# Patient Record
Sex: MALE | Race: WHITE | NOT HISPANIC OR LATINO | Employment: FULL TIME | URBAN - METROPOLITAN AREA
[De-identification: names, ages, dates, MRNs, and addresses within clinical notes are randomized per-mention and may not be internally consistent; named-entity substitution may affect disease eponyms.]

---

## 2018-06-15 ENCOUNTER — HOSPITAL ENCOUNTER (OUTPATIENT)
Dept: PHYSICAL THERAPY | Facility: HOSPITAL | Age: 38
Setting detail: THERAPIES SERIES
Discharge: HOME | End: 2018-06-15

## 2018-06-15 DIAGNOSIS — S33.6XXA SPRAIN OF SACROILIAC JOINT, INITIAL ENCOUNTER: Primary | ICD-10-CM

## 2018-06-15 PROCEDURE — 97110 THERAPEUTIC EXERCISES: CPT | Performed by: PHYSICAL THERAPIST

## 2018-06-15 PROCEDURE — 97161 PT EVAL LOW COMPLEX 20 MIN: CPT | Performed by: PHYSICAL THERAPIST

## 2018-06-15 NOTE — THERAPY EVALUATION
Outpatient Physical Therapy Ortho Initial Evaluation   [place]     Patient Name: Toy Hubbard  : 1980  MRN: [id number]  Today's Date: 6/15/2018      Visit Date: 06/15/2018    There is no problem list on file for this patient.       No past medical history on file.     No past surgical history on file.    Visit Dx:     ICD-10-CM ICD-9-CM   1. Sprain of sacroiliac joint, initial encounter S33.6XXA 846.9             Patient History     Row Name 06/15/18 0615             History    Chief Complaint Difficulty with daily activities;Pain  -GC      Type of Pain Back pain   right SI  -GC      Brief Description of Current Complaint Pt reports he has had SI joint problems since . He has been able to control the pain through exercise until recently. He states that over the last 8 months he has had increased pain since increasing his mileage he runs. He reports right SI pain with right sciatica symptoms and does have some left sided soreness also. He has had x-rays and MRI done both of which were negative.  -GC      Patient/Caregiver Goals Relieve pain;Improve mobility;Other (comment)   Pt wants to run without pain  -      Patient's Rating of General Health Very good  -GC      Hand Dominance right-handed  -      Occupation/sports/leisure activities pt is an avid runner  -      What clinical tests have you had for this problem? X-ray;MRI  -      Results of Clinical Tests negative  -GC         Pain     Pain Location Back;Buttocks   right SI  -GC      Pain at Present 1  -GC      Pain at Best 0  -GC      Pain at Worst 5  -GC      Pain Frequency Intermittent  -      Pain Description Aching;Discomfort;Sore;Radiating;Tender  -      What Performance Factors Make the Current Problem(s) WORSE? Pt c/o pain with physical activity and at times with sitting and walking  -      What Performance Factors Make the Current Problem(s) BETTER? Pt feels better if he rests, lies down  -      Difficulties  with recreational activities? Pt is unable to run/train the way he would like due to pain.  -GC         Daily Activities    Primary Language English  -GC      How does patient learn best? Listening;Reading  -GC      Teaching needs identified Home Exercise Program;Management of Condition  -GC      Patient is concerned about/has problems with Flexibility;Performing job responsibilities/community activities (work, school,;Performing sports, recreation, and play activities;Walking  -GC      Does patient have problems with the following? None  -GC      Barriers to learning None  -GC      Pt Participated in POC and Goals Yes  -GC         Safety    Are you being hurt, hit, or frightened by anyone at home or in your life? No  -GC      Are you being neglected by a caregiver No  -GC        User Key  (r) = Recorded By, (t) = Taken By, (c) = Cosigned By    Initials Name Provider Type    GC Navin Martinez, PT Physical Therapist                PT Ortho     Row Name 06/15/18 0615       Posture/Observations    Scoliosis Normal  -GC    Lumbar lordosis Decreased  -GC    Iliac crests Bilateral:;Normal  -GC    Posture/Observations Comments Pt has positive right LLNTT for DF/EV and DF/INV biases at approximately 30 degrees of SLRing  -GC       Neural Tension Signs- Lower Quarter Clearing    Slump Right:;Positive  -GC       Sensory Screen for Light Touch- Lower Quarter Clearing    L2 (anterior mid thigh) Bilateral:;Intact  -GC    L3 (distal anterior thigh) Bilateral:;Intact  -GC    L4 (medial lower leg/foot) Bilateral:;Intact  -GC    L5 (lateral lower leg/great toe) Bilateral:;Intact  -GC       Lumbosacral Palpation    SI Right:;Tender   right on left posterior sacral torsion  -GC    Lumbosacral Segment Tender   FRS left at L5/S1  -GC    Piriformis Right:;Tender  -GC    Gluteus Jaime Right:;Tender  -GC       Lumbosacral Accessory Motions    PA [place]- L1 WNL  -GC    PA [place]- L2 WNL  -GC    PA [place]- L3 WNL  -GC    PA Glide- L4  Hypomobile  -GC    PA Glide- L5 Hypomobile  -GC    PA glide- Sacral base Hypomobile  -GC       Lumbar/SI Special Tests    Stork Test (SI Dysfunction) Right:;Positive  -GC    SLR (Neural Tension) Right:;Positive  -GC    LALO (hip vs. SI Dysfunction) Bilateral:;Negative  -GC       Head/Neck/Trunk    Trunk Extension AROM 75% range with pain  -GC    Trunk Flexion AROM 50% range-limited by hamstring tightness  -GC    Trunk Lt Lateral Flexion AROM WFL  -GC    Trunk Rt Lateral Flexion AROM WFL  -GC    Trunk Lt Rotation AROM WFL  -GC    Trunk Rt Rotation AROM WFL  -GC       Trunk (Manual Muscle Testing)    MMT, Gross Movement: Trunk Flexion (5/5) normal  -GC    MMT, Gross Movement: Trunk Extension (5/5) normal  -GC       Right Hip (Manual Muscle Testing)    MMT: Flexion, Right Hip flexion  -GC    MMT, Gross Movement: Right Hip Flexion (5/5) normal  -GC    MMT: Extension, Right Hip extension  -GC    MMT, Gross Movement: Right Hip Extension (5/5) normal  -GC    MMT: ABduction, Right Hip abduction  -GC    MMT, Gross Movement: Right Hip ABduction (4+/5) good plus  -GC    MMT, Gross Movement: Right Hip ADduction (4+/5) good plus   adduction  -GC    MMT, Gross Movement: Right Hip Internal (Medial) Rotation (4/5) good   ER  -GC    MMT, Gross Movement: Right Hip External (Lateral) Rotation (4+/5) good plus   IR  -GC       Lower Extremity Flexibility    Hamstrings Right:;Moderately limited;Left:;WNL  -GC    Hip Flexors Bilateral:;WNL  -GC    Quadriceps Bilateral:;WNL  -GC    ITB Bilateral:;WNL  -GC    Hip External Rotators Right:;Mildly limited;Left:;WNL  -GC    Hip Internal Rotators Right:;Mildly limited;Left:;WNL  -GC       Transfers    Comment (Transfers) Pt is independent with all bed mobility and transfers  -GC       Gait/Stairs Assessment/Training    Comment (Gait/Stairs) Pt ambulates normally on levels  -      User Key  (r) = Recorded By, (t) = Taken By, (c) = Cosigned By    Initials Name Provider Type     Navin Martinez  PT Physical Therapist                      Therapy Education  Given: HEP, Symptoms/condition management, Pain management, Posture/body mechanics  Program: New  How Provided: Verbal, Demonstration, Written  Provided to: Patient  Level of Understanding: Teach back education performed, Verbalized, Demonstrated           PT OP Goals     Row Name 06/15/18 0615          PT Short Term Goals    STG Date to Achieve 06/29/18  -GC     STG 1 Decrease SI joint pain to 2-3/10 with activity.  -GC     STG 2 Increase trunk AROM to WNL all planes with testing.  -GC     STG 3 Increase hamstring and piriformis flexibility to WFL wiht testing.  -GC     STG 4 Pt will be independent with his HEP issued by this therapist.  -GC        Long Term Goals    LTG Date to Achieve 07/13/18  -GC     LTG 1 Decrease SI joint pain to 0-1/10 with activity.  -GC     LTG 2 Clear right LE of neural tension with testing.  -GC     LTG 3 Pt will be independent with all ADLs and able to run without increased pain.  -        Time Calculation    PT Goal Re-Cert Due Date 07/13/18  -       User Key  (r) = Recorded By, (t) = Taken By, (c) = Cosigned By    Initials Name Provider Type    GC Navin Martinez, PT Physical Therapist                PT Assessment/Plan     Row Name 06/15/18 0615          PT Assessment    Functional Limitations Limitations in functional capacity and performance;Performance in leisure activities  -     Impairments Impaired flexibility;Range of motion;Pain;Muscle strength;Joint mobility  -     Assessment Comments Pt presents with several month history of right SI paion and sciatica symptoms. He rates his pain up to 5/10 with activity. He has decreased trunk ROM, decreased spinal mobility, decreased hamstring and piriformis flexibility, decreased core and hip strength, and decreased fundtion secondary to the above.  -     Please refer to paper survey for additional self-reported information Yes  -GC     Rehab Potential Good  -GC      Patient/caregiver participated in establishment of treatment plan and goals Yes  -GC     Patient would benefit from skilled therapy intervention Yes  -GC        PT Plan    PT Frequency 1x/week;2x/week  -     Predicted Duration of Therapy Intervention (Therapy Eval) 4 weeks  -GC     Planned CPT's? PT EVAL LOW COMPLEXITY: 76125;PT THER PROC EA 15 MIN: 95388;PT MANUAL THERAPY EA 15 MIN: 22608;PT HOT OR COLD PACK TREAT MCARE;PT ELECTRICAL STIM UNATTEND:   -     PT Plan Comments Pt is to continue his HEP 2x daily  -       User Key  (r) = Recorded By, (t) = Taken By, (c) = Cosigned By    Initials Name Provider Type     Navin Juan, PT Physical Therapist                  Exercises     Row Name 06/15/18 0758 06/15/18 0615          Total Minutes    41793 - PT Therapeutic Exercise Minutes 28  -GC  --        Exercise 1    Exercise Name 1  -- Hamstring stretch  -GC     Cueing 1  -- Verbal;Tactile  -GC     Reps 1  -- 15  -GC     Time 1  -- 10 secs  -GC        Exercise 2    Exercise Name 2  -- Piriformis stretch  -GC     Cueing 2  -- Verbal;Tactile  -GC     Reps 2  -- 15  -GC     Time 2  -- 10 secs  -GC        Exercise 3    Exercise Name 3  -- LLNT mobilizations for DF/EV and DF/INV biases  -GC     Cueing 3  -- Verbal;Tactile  -GC     Time 3  -- 5 min  -GC        Exercise 4    Exercise Name 4  -- MET for FRS left at L5/S1 and right on left posterior sacral torsion  -GC     Cueing 4  -- Verbal;Tactile  -GC     Time 4  -- 3 min  -GC        Exercise 5    Exercise Name 5  -- Unilateral press up on right   -GC     Cueing 5  -- Verbal;Demo  -GC     Reps 5  -- 20  -GC        Exercise 6    Exercise Name 6  -- Supine clam shell vs theraband  -GC     Cueing 6  -- Verbal;Tactile  -GC     Reps 6  -- 25 bilateral  -GC     Additional Comments  -- silver  -GC        Exercise 7    Exercise Name 7  -- Bridge vs theraband  -GC     Cueing 7  -- Verbal;Tactile  -GC     Reps 7  -- 25  -GC     Additional Comments  -- silver  -GC         Exercise 8    Exercise Name 8  -- Hip ER vs theraband  -GC     Cueing 8  -- Verbal;Tactile  -GC        Exercise 9    Exercise Name 9  -- Hip IR vs theraband  -GC       User Key  (r) = Recorded By, (t) = Taken By, (c) = Cosigned By    Initials Name Provider Type    GC Navin Martinez, PT Physical Therapist                                  Time Calculation:     Therapy Suggested Charges     Code   Minutes Charges    77259 (CPT®) Hc Pt Neuromusc Re Education Ea 15 Min      05072 (CPT®) Hc Pt Ther Proc Ea 15 Min 28 2    21075 (CPT®) Hc Gait Training Ea 15 Min      31225 (CPT®) Hc Pt Therapeutic Act Ea 15 Min      83165 (CPT®) Hc Pt Manual Therapy Ea 15 Min      01125 (CPT®) Hc Pt Ther Massage- Per 15 Min      85959 (CPT®) Hc Pt Iontophoresis Ea 15 Min      89281 (CPT®) Hc Pt Elec Stim Ea-Per 15 Min      12918 (CPT®) Hc Pt Ultrasound Ea 15 Min      99326 (CPT®) Hc Pt Self Care/Mgmt/Train Ea 15 Min      Total  28 2          Start Time: 0615  Stop Time: 0712  Time Calculation (min): 57 min     Therapy Charges for Today     Code Description Service Date Service Provider Modifiers Qty    1980856 HC PT THER PROC EA 15 MIN 6/15/2018 Navin Martinez, PT GP 2    89486607339 HC PT EVAL LOW COMPLEXITY 2 6/15/2018 Navin Martinez, PT GP 1                    Navin Martinez, PT  6/15/2018

## 2018-06-18 ENCOUNTER — APPOINTMENT (OUTPATIENT)
Dept: PHYSICAL THERAPY | Facility: HOSPITAL | Age: 38
End: 2018-06-18

## 2018-06-20 ENCOUNTER — HOSPITAL ENCOUNTER (OUTPATIENT)
Dept: PHYSICAL THERAPY | Facility: HOSPITAL | Age: 38
Setting detail: THERAPIES SERIES
Discharge: HOME | End: 2018-06-20

## 2018-06-20 DIAGNOSIS — S33.6XXA SPRAIN OF SACROILIAC JOINT, INITIAL ENCOUNTER: Primary | ICD-10-CM

## 2018-06-20 PROCEDURE — 97110 THERAPEUTIC EXERCISES: CPT | Performed by: PHYSICAL THERAPIST

## 2018-06-20 NOTE — THERAPY TREATMENT NOTE
Outpatient Physical Therapy Ortho Treatment Note  [facility]     Patient Name: Toy Hubbard  : 1980  MRN: [id number]  Today's Date: 2018      Visit Date: 2018    Visit Dx:    ICD-10-CM ICD-9-CM   1. Sprain of sacroiliac joint, initial encounter S33.6XXA 846.9       There is no problem list on file for this patient.       No past medical history on file.     No past surgical history on file.          PT Ortho     Row Name 18 0610       Subjective Comments    Subjective Comments Pt states he feels better today than he has felt in the last 6 months.  -GC       Lumbosacral Accessory Motions    PA [place]- L4 WNL  -GC    PA Glide- L5 Hypomobile  -GC    PA glide- Sacral base Hypomobile  -GC       Lumbar/SI Special Tests    Stork Test (SI Dysfunction) Right:;Positive  -GC    SLR (Neural Tension) Right:;Positive  -GC       Head/Neck/Trunk    Trunk Extension AROM WFL  -GC    Trunk Flexion AROM 75% range  -GC    Trunk Lt Lateral Flexion AROM WFL  -GC    Trunk Rt Lateral Flexion AROM WFL  -GC    Trunk Lt Rotation AROM WFL  -GC    Trunk Rt Rotation AROM WFL  -GC      User Key  (r) = Recorded By, (t) = Taken By, (c) = Cosigned By    Initials Name Provider Type    KRAIG Martinez, PT Physical Therapist                            PT Assessment/Plan     Row Name 18 0610          PT Assessment    Assessment Comments Pt is doing well with decreased c/o pain, improved ROM and flexiblity, and improved alignment and neural mobility.  -GC        PT Plan    PT Plan Comments Pt is to continue his HEP daily. will re-ck again next week.  -GC       User Key  (r) = Recorded By, (t) = Taken By, (c) = Cosigned By    Initials Name Provider Type    KRAIG Martinez PT Physical Therapist                    Exercises     Row Name 18 0610             Subjective Comments    Subjective Comments Pt states he feels better today than he has felt in the last 6 months.  -GC         Total Minutes    89458 -  PT Therapeutic Exercise Minutes 31  -GC         Exercise 1    Exercise Name 1 Hamstring stretch  -GC      Cueing 1 Verbal;Tactile  -GC      Reps 1 15  -GC      Time 1 10 secs  -GC         Exercise 2    Exercise Name 2 Piriformis stretch  -GC      Cueing 2 Verbal;Tactile  -GC      Reps 2 15  -GC      Time 2 10 secs  -GC         Exercise 3    Exercise Name 3 LLNT mobilizations for DF/EV and DF/INV biases  -GC      Cueing 3 Verbal;Tactile  -GC      Time 3 5 min  -GC         Exercise 4    Exercise Name 4 MET for FRS left at L5/S1 and right on left posterior sacral torsion  -GC      Cueing 4 Verbal;Tactile  -GC      Time 4 3 min  -GC         Exercise 5    Exercise Name 5 Unilateral press up on right   -GC      Cueing 5 Verbal;Demo  -GC      Reps 5 20  -GC         Exercise 6    Exercise Name 6 Supine clam shell vs theraband  -GC      Cueing 6 Verbal;Tactile  -GC      Reps 6 25 bilateral  -GC      Additional Comments gold  -GC         Exercise 7    Exercise Name 7 Bridge vs theraband  -GC      Cueing 7 Verbal;Tactile  -GC      Reps 7 25  -GC      Additional Comments gold  -GC         Exercise 8    Exercise Name 8 Hip ER vs theraband  -GC      Cueing 8 Verbal;Tactile  -GC      Reps 8 25  -GC      Additional Comments gold  -GC         Exercise 9    Exercise Name 9 Hip IR vs theraband  -GC      Cueing 9 Verbal;Tactile  -GC      Reps 9 25  -GC      Additional Comments gold  -GC         Exercise 10    Exercise Name 10 AP mobilizations L4, L5  -GC      Time 10 3 min  -GC        User Key  (r) = Recorded By, (t) = Taken By, (c) = Cosigned By    Initials Name Provider Type     Navin Martinez PT Physical Therapist                                            Time Calculation:   Start Time: 0610  Stop Time: 0648  Time Calculation (min): 38 min  Therapy Suggested Charges     Code   Minutes Charges    12628 (CPT®) Hc Pt Neuromusc Re Education Ea 15 Min      10440 (CPT®) Hc Pt Ther Proc Ea 15 Min 31 2    79272 (CPT®) Hc Gait Training Ea  15 Min      64302 (CPT®) Hc Pt Therapeutic Act Ea 15 Min      92092 (CPT®) Hc Pt Manual Therapy Ea 15 Min      96239 (CPT®) Hc Pt Ther Massage- Per 15 Min      46302 (CPT®) Hc Pt Iontophoresis Ea 15 Min      37595 (CPT®) Hc Pt Elec Stim Ea-Per 15 Min      45623 (CPT®) Hc Pt Ultrasound Ea 15 Min      44799 (CPT®) Hc Pt Self Care/Mgmt/Train Ea 15 Min      Total  31 2        Therapy Charges for Today     Code Description Service Date Service Provider Modifiers Qty    86588492542 HC PT THER PROC EA 15 MIN 6/20/2018 Navin Martinez, PT GP 2                    Navin Martinez, PT  6/20/2018

## 2018-08-23 ENCOUNTER — DOCUMENTATION (OUTPATIENT)
Dept: PHYSICAL THERAPY | Facility: HOSPITAL | Age: 38
End: 2018-08-23

## 2018-08-23 DIAGNOSIS — S33.6XXA SPRAIN OF SACROILIAC JOINT, INITIAL ENCOUNTER: Primary | ICD-10-CM

## 2018-08-23 NOTE — THERAPY TREATMENT NOTE
Outpatient Physical Therapy Ortho Progress Note       Patient Name: Toy Hubbard  : 1980  MRN: [id number]  Today's Date: 2018      Visit Date: 2018    Visit Dx:    ICD-10-CM ICD-9-CM   1. Sprain of sacroiliac joint, initial encounter S33.6XXA 846.9       There is no problem list on file for this patient.       No past medical history on file.     No past surgical history on file.          PT Ortho     Row Name 18 3661       Subjective Comments    Subjective Comments Pt states he is feeling better, but he did have some right sided tightness after doin a 4 mile race.  -GC       Lumbosacral Palpation    SI Right:;Tender   right on left post. sacral torsion  -GC    Lumbosacral Segment Tender   FRS left at L5/S1  -GC       Lumbosacral Accessory Motions    PA Glide- L5 Hypomobile  -GC    PA glide- Sacral base Hypomobile  -GC       Lumbar/SI Special Tests    Stork Test (SI Dysfunction) Right:;Positive  -GC    LALO (hip vs. SI Dysfunction) Right:;Positive  -GC       Head/Neck/Trunk    Trunk Extension AROM WFL  -GC    Trunk Flexion AROM WFL  -GC    Trunk Lt Lateral Flexion AROM WFL  -GC    Trunk Rt Lateral Flexion AROM WFL  -GC    Trunk Lt Rotation AROM WFL  -GC    Trunk Rt Rotation AROM WFL  -GC      User Key  (r) = Recorded By, (t) = Taken By, (c) = Cosigned By    Initials Name Provider Type    Navin Nolan, PT Physical Therapist                            PT Assessment/Plan     Row Name 18 8514          PT Assessment    Assessment Comments Pt is doing well with improved ROM and better function.  -GC        PT Plan    PT Plan Comments Pt is to continue his HEP daily.  -GC       User Key  (r) = Recorded By, (t) = Taken By, (c) = Cosigned By    Initials Name Provider Type    Navin Nolan, PT Physical Therapist                    Exercises     Row Name 18 5357             Subjective Comments    Subjective Comments Pt states he is feeling better, but he did have some  right sided tightness after doin a 4 mile race.  -GC         Exercise 1    Exercise Name 1 Hamstring stretch  -GC      Cueing 1 Verbal;Tactile  -GC      Reps 1 15  -GC      Time 1 10 secs  -GC         Exercise 2    Exercise Name 2 Piriformis stretch  -GC      Cueing 2 Verbal;Tactile  -GC      Reps 2 15  -GC      Time 2 10 secs  -GC         Exercise 3    Exercise Name 3 LLNT mobilizations for DF/EV and DF/INV biases  -GC      Cueing 3 Verbal;Tactile  -GC      Time 3 5 min  -GC         Exercise 4    Exercise Name 4 MET for FRS left at L5/S1 and right on left posterior sacral torsion  -GC      Cueing 4 Verbal;Tactile  -GC      Time 4 3 min  -GC         Exercise 5    Exercise Name 5 Unilateral press up on right   -GC      Cueing 5 Verbal;Demo  -GC      Reps 5 20  -GC         Exercise 6    Exercise Name 6 Prone hip extension  -GC      Cueing 6 Verbal;Tactile  -GC      Reps 6 40  -GC         Exercise 7    Exercise Name 7 Prone chest raise  -GC      Cueing 7 Verbal;Tactile  -GC      Reps 7 25  -GC        User Key  (r) = Recorded By, (t) = Taken By, (c) = Cosigned By    Initials Name Provider Type     Navin Martinez, PT Physical Therapist                                            Time Calculation:      Therapy Suggested Charges     Code   Minutes Charges    None                         Navin Martinez, JULES  8/23/2018

## 2020-06-19 ENCOUNTER — OFFICE VISIT (OUTPATIENT)
Dept: FAMILY MEDICINE CLINIC | Facility: CLINIC | Age: 40
End: 2020-06-19

## 2020-06-19 VITALS
DIASTOLIC BLOOD PRESSURE: 80 MMHG | HEART RATE: 57 BPM | WEIGHT: 160 LBS | OXYGEN SATURATION: 98 % | HEIGHT: 73 IN | SYSTOLIC BLOOD PRESSURE: 128 MMHG | BODY MASS INDEX: 21.2 KG/M2 | TEMPERATURE: 98 F

## 2020-06-19 DIAGNOSIS — Z00.00 ANNUAL PHYSICAL EXAM: Primary | ICD-10-CM

## 2020-06-19 PROCEDURE — 99386 PREV VISIT NEW AGE 40-64: CPT | Performed by: FAMILY MEDICINE

## 2020-06-19 NOTE — PROGRESS NOTES
Chief Complaint   Patient presents with   • Annual Exam   • Establish Care       Subjective     Toy Hubbard is a 40 y.o. male and is here for a yearly physical exam. The patient reports no problems.    Do you take any herbs or supplements that were not prescribed by a doctor? yes. If so, these will be added to active medication list.    40-year-old male here for annual exam and to help establish care    Denies any known past medical history.    He is a cross-country runner.  He does do physical therapy for occasional strengthening/functional concerns.  He does not smoke.  His BMI is appropriate and he is normotensive.  He takes no daily medications and eats healthy.  No known medical issues.  He feels well overall.  He works in software.    He denies any family history of colon or breast cancer.  No known family history of heart disease or diabetes.    He is up-to-date on his vaccinations and tetanus as far as he is aware.  He gets yearly flu shots.    History reviewed. No pertinent past medical history.    Past Surgical History:   Procedure Laterality Date   • GANGLION CYST EXCISION  12/12/2015   • WISDOM TOOTH EXTRACTION  1998       Family History   Problem Relation Age of Onset   • Obesity Maternal Grandmother    • Obesity Maternal Grandfather        Social History     Socioeconomic History   • Marital status:      Spouse name: Not on file   • Number of children: Not on file   • Years of education: Not on file   • Highest education level: Not on file   Tobacco Use   • Smoking status: Never Smoker   • Smokeless tobacco: Never Used   Substance and Sexual Activity   • Alcohol use: Yes     Comment: 1-2 per week   • Drug use: Never       No current outpatient medications on file prior to visit.     No current facility-administered medications on file prior to visit.        Review of Systems   Constitutional: Negative for activity change, chills and fever.   HENT: Negative for congestion, postnasal  "drip and rhinorrhea.    Eyes: Negative for blurred vision and pain.   Respiratory: Negative for cough, chest tightness and shortness of breath.    Cardiovascular: Negative for chest pain.   Gastrointestinal: Negative for abdominal pain, constipation, diarrhea, nausea and vomiting.   Endocrine: Negative for cold intolerance and heat intolerance.   Genitourinary: Negative for decreased urine volume, dysuria and frequency.   Musculoskeletal: Negative for arthralgias, back pain and myalgias.   Skin: Negative for rash and skin lesions.   Neurological: Negative for dizziness and confusion.   Psychiatric/Behavioral: Negative for agitation, behavioral problems and depressed mood.         Vitals:    06/19/20 0932   BP: 128/80   Pulse: 57   Temp: 98 °F (36.7 °C)   SpO2: 98%   Weight: 72.6 kg (160 lb)   Height: 185.4 cm (73\")       General Appearance:  Alert, cooperative, no distress, appears stated age   Head:  Normocephalic, without obvious abnormality, atraumatic   Eyes:  PERRL, conjunctiva/corneas clear   Ears:  Normal TM's and external ear canals, both ears   Nose: Nares normal, septum midline, mucosa normal, no drainage or sinus tenderness   Throat: Lips, mucosa, and tongue normal; teeth and gums normal   Neck: Supple, symmetrical, trachea midline, no adenopathy;   Thyroid.   Back:  Symmetric, no curvature, ROM normal, no CVA tenderness   Lungs:  Clear to auscultation bilaterally, respirations unlabored   Chest wall:  No tenderness or deformity   Heart:  Regular rate and rhythm, S1 and S2 normal, no murmur, rub or gallop   Abdomen:  Soft, non-tender, bowel sounds active all four quadrants,   no masses, no organomegaly   Rectal:        Extremities: Extremities normal, atraumatic, no cyanosis or edema   Pulses: 2+ and symmetric all extremities   Skin: Skin color, texture, turgor normal, no rashes or lesions   Lymph nodes: Cervical, supraclavicular, and axillary nodes normal   Neurologic: Normal strength, sensation and " reflexes   throughout        No results found for this or any previous visit.  Assessment/Plan   Healthy male exam.    Toy was seen today for annual exam and establish care.    Diagnoses and all orders for this visit:    Annual physical exam  -     Lipid panel; Future  -     CBC & Differential; Future  -     Comprehensive metabolic panel; Future  -     TSH; Future      1. Annual    2. Patient Counseling:  --Nutrition: Stressed importance of moderation in sodium/caffeine intake, saturated fat and cholesterol.  Discussed caloric balance, sufficient intake of fresh fruits, vegetables, fiber, calcium, iron.  --Discussed the new recommendation against daily use of baby aspirin for primary prevention in low risk patients.  --Exercise: Stressed the importance of regular exercise.   --Substance Abuse: Discussed cessation/primary prevention of tobacco, alcohol, or other drug use; driving or other dangerous activities under the influence.    --Dental health: Discussed importance of regular tooth brushing, flossing, and dental visits.  -- suggested having eyes and vision checked if needed or past due.  --Immunizations reviewed.  --Discussed benefits of screening colonoscopy.    3. Discussed the patient's BMI with him.  The BMI is in the acceptable range  4. Follow up as needed for acute illness    -Very active and healthy.  -No current complaints or concerns.  -Will schedule for fasting labs    Lindsay Le MD  [facility]

## 2020-06-24 ENCOUNTER — RESULTS ENCOUNTER (OUTPATIENT)
Dept: FAMILY MEDICINE CLINIC | Facility: CLINIC | Age: 40
End: 2020-06-24

## 2020-06-24 DIAGNOSIS — Z00.00 ANNUAL PHYSICAL EXAM: ICD-10-CM

## 2020-07-14 LAB
ALBUMIN SERPL-MCNC: 4.5 G/DL (ref 3.5–5.2)
ALBUMIN/GLOB SERPL: 2 G/DL
ALP SERPL-CCNC: 64 U/L (ref 39–117)
ALT SERPL-CCNC: 36 U/L (ref 1–41)
AST SERPL-CCNC: 32 U/L (ref 1–40)
BASOPHILS # BLD AUTO: 0.04 10*3/MM3 (ref 0–0.2)
BASOPHILS NFR BLD AUTO: 0.8 % (ref 0–1.5)
BILIRUB SERPL-MCNC: 1.9 MG/DL (ref 0–1.2)
BUN SERPL-MCNC: 17 MG/DL (ref 6–20)
BUN/CREAT SERPL: 18.9 (ref 7–25)
CALCIUM SERPL-MCNC: 9.2 MG/DL (ref 8.6–10.5)
CHLORIDE SERPL-SCNC: 100 MMOL/L (ref 98–107)
CHOLEST SERPL-MCNC: 183 MG/DL (ref 0–200)
CO2 SERPL-SCNC: 28 MMOL/L (ref 22–29)
CREAT SERPL-MCNC: 0.9 MG/DL (ref 0.76–1.27)
EOSINOPHIL # BLD AUTO: 0.13 10*3/MM3 (ref 0–0.4)
EOSINOPHIL NFR BLD AUTO: 2.6 % (ref 0.3–6.2)
ERYTHROCYTE [DISTWIDTH] IN BLOOD BY AUTOMATED COUNT: 12.5 % (ref 12.3–15.4)
GLOBULIN SER CALC-MCNC: 2.3 GM/DL
GLUCOSE SERPL-MCNC: 91 MG/DL (ref 65–99)
HCT VFR BLD AUTO: 43.2 % (ref 37.5–51)
HDLC SERPL-MCNC: 65 MG/DL (ref 40–60)
HGB BLD-MCNC: 14.9 G/DL (ref 13–17.7)
IMM GRANULOCYTES # BLD AUTO: 0.01 10*3/MM3 (ref 0–0.05)
IMM GRANULOCYTES NFR BLD AUTO: 0.2 % (ref 0–0.5)
LDLC SERPL CALC-MCNC: 108 MG/DL (ref 0–100)
LYMPHOCYTES # BLD AUTO: 2.32 10*3/MM3 (ref 0.7–3.1)
LYMPHOCYTES NFR BLD AUTO: 46.1 % (ref 19.6–45.3)
Lab: NORMAL
MCH RBC QN AUTO: 30.8 PG (ref 26.6–33)
MCHC RBC AUTO-ENTMCNC: 34.5 G/DL (ref 31.5–35.7)
MCV RBC AUTO: 89.4 FL (ref 79–97)
MONOCYTES # BLD AUTO: 0.54 10*3/MM3 (ref 0.1–0.9)
MONOCYTES NFR BLD AUTO: 10.7 % (ref 5–12)
NEUTROPHILS # BLD AUTO: 1.99 10*3/MM3 (ref 1.7–7)
NEUTROPHILS NFR BLD AUTO: 39.6 % (ref 42.7–76)
NRBC BLD AUTO-RTO: 0 /100 WBC (ref 0–0.2)
PLATELET # BLD AUTO: 255 10*3/MM3 (ref 140–450)
POTASSIUM SERPL-SCNC: 4.8 MMOL/L (ref 3.5–5.2)
PROT SERPL-MCNC: 6.8 G/DL (ref 6–8.5)
RBC # BLD AUTO: 4.83 10*6/MM3 (ref 4.14–5.8)
SODIUM SERPL-SCNC: 137 MMOL/L (ref 136–145)
TRIGL SERPL-MCNC: 52 MG/DL (ref 0–150)
TSH SERPL DL<=0.005 MIU/L-ACNC: 1.62 UIU/ML (ref 0.27–4.2)
VLDLC SERPL CALC-MCNC: 10.4 MG/DL
WBC # BLD AUTO: 5.03 10*3/MM3 (ref 3.4–10.8)

## 2020-07-15 ENCOUNTER — TELEPHONE (OUTPATIENT)
Dept: FAMILY MEDICINE CLINIC | Facility: CLINIC | Age: 40
End: 2020-07-15

## 2021-04-06 ENCOUNTER — BULK ORDERING (OUTPATIENT)
Dept: CASE MANAGEMENT | Facility: OTHER | Age: 41
End: 2021-04-06

## 2021-04-06 DIAGNOSIS — Z23 IMMUNIZATION DUE: ICD-10-CM

## 2022-12-19 ENCOUNTER — OFFICE VISIT (OUTPATIENT)
Dept: INTERNAL MEDICINE | Facility: CLINIC | Age: 42
End: 2022-12-19

## 2022-12-19 VITALS
DIASTOLIC BLOOD PRESSURE: 68 MMHG | HEIGHT: 73 IN | WEIGHT: 159.8 LBS | SYSTOLIC BLOOD PRESSURE: 106 MMHG | BODY MASS INDEX: 21.18 KG/M2 | HEART RATE: 59 BPM | OXYGEN SATURATION: 99 % | TEMPERATURE: 97.8 F

## 2022-12-19 DIAGNOSIS — Z00.00 ENCOUNTER FOR WELLNESS EXAMINATION IN ADULT: Primary | ICD-10-CM

## 2022-12-19 DIAGNOSIS — Z23 NEED FOR INFLUENZA VACCINATION: ICD-10-CM

## 2022-12-19 PROBLEM — Z91.09 ENVIRONMENTAL ALLERGIES: Chronic | Status: ACTIVE | Noted: 2022-12-19

## 2022-12-19 PROCEDURE — 90471 IMMUNIZATION ADMIN: CPT | Performed by: NURSE PRACTITIONER

## 2022-12-19 PROCEDURE — 99396 PREV VISIT EST AGE 40-64: CPT | Performed by: NURSE PRACTITIONER

## 2022-12-19 PROCEDURE — 90686 IIV4 VACC NO PRSV 0.5 ML IM: CPT | Performed by: NURSE PRACTITIONER

## 2022-12-19 NOTE — PROGRESS NOTES
"Subjective    Toy Hubbard is a 42 y.o. male presenting today for   Chief Complaint   Patient presents with   • Establish Care     New patient   • Annual Exam       History of Present Illness     Toy Hubbard presents today as a new patient to me to establish care.   Prior PCP was Lindsay Le.  Patient Care Team:  Brenda Thurman APRN as PCP - General (Family Medicine)    Current/chronic health conditions include:    Patient Active Problem List   Diagnosis   • Environmental allergies       No outpatient medications have been marked as taking for the 12/19/22 encounter (Office Visit) with Brenda Thurman APRN.       In terms of exercise, he runs competitively, up to 80 miles/wk. There is also some wgt lifting each week.  In terms of nutrition, \"I know what is healthy and try to eat healthy but my diet is all over the place.\"    There is no FH of prostate or colon cancers.        The following portions of the patient's history were reviewed and updated as appropriate: allergies, current medications, problem list, past medical history, past surgical history, family history, and social history.         Objective    Vitals:    12/19/22 1244   BP: 106/68   BP Location: Left arm   Pulse: 59   Temp: 97.8 °F (36.6 °C)   SpO2: 99%   Weight: 72.5 kg (159 lb 12.8 oz)   Height: 184.4 cm (72.6\")     Body mass index is 21.32 kg/m².  Nursing notes and vitals reviewed.    Physical Exam  Constitutional:       General: He is not in acute distress.     Appearance: Normal appearance. He is well-developed.   HENT:      Head: Normocephalic.      Right Ear: Hearing, tympanic membrane, ear canal and external ear normal.      Left Ear: Hearing, tympanic membrane, ear canal and external ear normal.      Nose: Nose normal. No mucosal edema or rhinorrhea.      Mouth/Throat:      Mouth: Mucous membranes are moist.      Pharynx: Oropharynx is clear. Uvula midline.   Eyes:      General: Lids are normal.    "   Extraocular Movements: Extraocular movements intact.      Conjunctiva/sclera: Conjunctivae normal.      Pupils: Pupils are equal, round, and reactive to light.   Neck:      Thyroid: No thyroid mass or thyromegaly.   Cardiovascular:      Rate and Rhythm: Regular rhythm.      Pulses: Normal pulses.      Heart sounds: S1 normal and S2 normal. No murmur heard.    No friction rub. No gallop.   Pulmonary:      Effort: Pulmonary effort is normal.      Breath sounds: Normal breath sounds. No wheezing, rhonchi or rales.   Abdominal:      General: Bowel sounds are normal.      Palpations: Abdomen is soft.      Tenderness: There is no abdominal tenderness. There is no guarding.      Hernia: No hernia is present.   Musculoskeletal:         General: No deformity. Normal range of motion.      Cervical back: Normal range of motion and neck supple.   Lymphadenopathy:      Cervical: No cervical adenopathy.   Skin:     General: Skin is warm and dry.      Findings: No lesion or rash.   Neurological:      General: No focal deficit present.      Mental Status: He is alert and oriented to person, place, and time.      Cranial Nerves: No cranial nerve deficit.      Sensory: No sensory deficit.      Motor: Motor function is intact.      Coordination: Coordination is intact.      Gait: Gait normal.      Deep Tendon Reflexes: Reflexes are normal and symmetric.   Psychiatric:         Attention and Perception: He is attentive.         Mood and Affect: Mood and affect normal.         Speech: Speech normal.         Behavior: Behavior normal.         Thought Content: Thought content normal.         No results found for this or any previous visit (from the past 672 hour(s)).      Assessment and Plan    Diagnoses and all orders for this visit:    1. Encounter for wellness examination in adult (Primary)  -     CBC (No Diff)  -     Comprehensive Metabolic Panel  -     Hepatitis C Antibody  -     Lipid Panel With / Chol / HDL Ratio  -     TSH    2.  Need for influenza vaccination  -     FluLaval/Fluzone >6 mos (3422-8568)        Preventative counseling completed including relevant screenings, appropriate vaccinations, healthy nutrition, and appropriate physical activity. Age-appropriate Screening & Interventions recommended by USPSTF were reviewed with the patient, and Health Maintenance was updated in Epic.        Medications, including side effects, were discussed with the patient. Patient verbalized understanding.  The plan of care was discussed. All questions were answered. Patient verbalized understanding.        Return for fasting labs ASAP.

## 2022-12-22 LAB
ALBUMIN SERPL-MCNC: 4.4 G/DL (ref 3.5–5.2)
ALBUMIN/GLOB SERPL: 2 G/DL
ALP SERPL-CCNC: 99 U/L (ref 39–117)
ALT SERPL-CCNC: 36 U/L (ref 1–41)
AST SERPL-CCNC: 31 U/L (ref 1–40)
BILIRUB SERPL-MCNC: 2.1 MG/DL (ref 0–1.2)
BUN SERPL-MCNC: 17 MG/DL (ref 6–20)
BUN/CREAT SERPL: 17.5 (ref 7–25)
CALCIUM SERPL-MCNC: 9 MG/DL (ref 8.6–10.5)
CHLORIDE SERPL-SCNC: 102 MMOL/L (ref 98–107)
CHOLEST SERPL-MCNC: 184 MG/DL (ref 0–200)
CHOLEST/HDLC SERPL: 3.02 {RATIO}
CO2 SERPL-SCNC: 30.1 MMOL/L (ref 22–29)
CREAT SERPL-MCNC: 0.97 MG/DL (ref 0.76–1.27)
EGFRCR SERPLBLD CKD-EPI 2021: 100 ML/MIN/1.73
ERYTHROCYTE [DISTWIDTH] IN BLOOD BY AUTOMATED COUNT: 12.5 % (ref 12.3–15.4)
GLOBULIN SER CALC-MCNC: 2.2 GM/DL
GLUCOSE SERPL-MCNC: 97 MG/DL (ref 65–99)
HCT VFR BLD AUTO: 42.5 % (ref 37.5–51)
HCV AB S/CO SERPL IA: 0.1 S/CO RATIO (ref 0–0.9)
HDLC SERPL-MCNC: 61 MG/DL (ref 40–60)
HGB BLD-MCNC: 14.7 G/DL (ref 13–17.7)
LDLC SERPL CALC-MCNC: 113 MG/DL (ref 0–100)
MCH RBC QN AUTO: 31.3 PG (ref 26.6–33)
MCHC RBC AUTO-ENTMCNC: 34.6 G/DL (ref 31.5–35.7)
MCV RBC AUTO: 90.6 FL (ref 79–97)
PLATELET # BLD AUTO: 264 10*3/MM3 (ref 140–450)
POTASSIUM SERPL-SCNC: 4.6 MMOL/L (ref 3.5–5.2)
PROT SERPL-MCNC: 6.6 G/DL (ref 6–8.5)
RBC # BLD AUTO: 4.69 10*6/MM3 (ref 4.14–5.8)
SODIUM SERPL-SCNC: 139 MMOL/L (ref 136–145)
TRIGL SERPL-MCNC: 51 MG/DL (ref 0–150)
TSH SERPL DL<=0.005 MIU/L-ACNC: 2.14 UIU/ML (ref 0.27–4.2)
VLDLC SERPL CALC-MCNC: 10 MG/DL (ref 5–40)
WBC # BLD AUTO: 5.51 10*3/MM3 (ref 3.4–10.8)

## 2022-12-23 ENCOUNTER — OFFICE VISIT (OUTPATIENT)
Dept: INTERNAL MEDICINE | Facility: CLINIC | Age: 42
End: 2022-12-23

## 2022-12-23 ENCOUNTER — HOSPITAL ENCOUNTER (OUTPATIENT)
Dept: GENERAL RADIOLOGY | Facility: HOSPITAL | Age: 42
Discharge: HOME | End: 2022-12-23
Admitting: NURSE PRACTITIONER

## 2022-12-23 VITALS
OXYGEN SATURATION: 95 % | BODY MASS INDEX: 21.66 KG/M2 | TEMPERATURE: 97.8 F | SYSTOLIC BLOOD PRESSURE: 104 MMHG | HEART RATE: 50 BPM | HEIGHT: 73 IN | WEIGHT: 163.4 LBS | DIASTOLIC BLOOD PRESSURE: 68 MMHG

## 2022-12-23 DIAGNOSIS — S69.91XA HAND INJURY, RIGHT, INITIAL ENCOUNTER: ICD-10-CM

## 2022-12-23 DIAGNOSIS — M79.641 RIGHT HAND PAIN: Primary | ICD-10-CM

## 2022-12-23 PROCEDURE — 99213 OFFICE O/P EST LOW 20 MIN: CPT | Performed by: NURSE PRACTITIONER

## 2022-12-23 PROCEDURE — 73130 X-RAY EXAM OF HAND: CPT

## 2022-12-23 NOTE — PROGRESS NOTES
"Toy Hubbard is a 42 y.o. male presenting today for   Chief Complaint   Patient presents with   • Hand Pain     Injured right thumb 4 weeks ago while playing soccer and pain has not improved.       Subjective    History of Present Illness     Injury to R hand playing soccer approx 4 wks ago. Notes continued pain at the base of the 1st digit. The level of pain is unchanged. No OTCs or home remedies.      The following portions of the patient's history were reviewed and updated as appropriate: allergies, current medications, problem list, past medical history, past surgical history, family history, and social history.          Objective    Vitals:    12/23/22 1339   BP: 104/68   BP Location: Left arm   Pulse: 50   Temp: 97.8 °F (36.6 °C)   TempSrc: Infrared   SpO2: 95%   Weight: 74.1 kg (163 lb 6.4 oz)   Height: 184.4 cm (72.6\")     Body mass index is 21.8 kg/m².  Nursing notes and vitals reviewed.    Physical Exam  Constitutional:       General: He is not in acute distress.     Appearance: He is well-developed.   Pulmonary:      Effort: Pulmonary effort is normal. No respiratory distress.   Musculoskeletal:      Right hand: Bony tenderness present. No swelling or deformity. Normal range of motion. Decreased strength of finger abduction. Normal sensation. Normal capillary refill.   Neurological:      Mental Status: He is alert and oriented to person, place, and time.   Psychiatric:         Speech: Speech normal.         Thought Content: Thought content normal.           Assessment and Plan    Diagnoses and all orders for this visit:    1. Right hand pain (Primary)  -     XR Hand 3+ View Right    2. Hand injury, right, initial encounter  -     XR Hand 3+ View Right        Anticipate referral to Hand Surgery pending XR results.      Medications, including side effects, were discussed with the patient. Patient verbalized understanding.  The plan of care was discussed. All questions were answered. Patient " verbalized understanding.        Return if symptoms worsen or fail to improve.

## 2022-12-30 ENCOUNTER — PATIENT ROUNDING (BHMG ONLY) (OUTPATIENT)
Dept: INTERNAL MEDICINE | Facility: CLINIC | Age: 42
End: 2022-12-30

## 2022-12-30 NOTE — PROGRESS NOTES
My name is Steph Sharma and I am the Referral clerk at [facility].     I would like  to officially welcome you to our practice and ask about your recent visit.     Tell me about your visit with us. What things went well?        We're always looking for ways to make our patients' experiences even better. Do you have recommendations on ways we may improve?      Overall were you satisfied with your first visit to our practice?        I appreciate you taking the time to answer these questions. Is there anything else I can do for you?       Thank you, and have a great day.     Steph

## 2025-01-10 ENCOUNTER — TELEPHONE (OUTPATIENT)
Dept: INTERNAL MEDICINE | Facility: CLINIC | Age: 45
End: 2025-01-10
Payer: COMMERCIAL

## 2025-01-17 ENCOUNTER — OFFICE VISIT (OUTPATIENT)
Dept: INTERNAL MEDICINE | Facility: CLINIC | Age: 45
End: 2025-01-17
Payer: COMMERCIAL

## 2025-01-17 VITALS
DIASTOLIC BLOOD PRESSURE: 62 MMHG | TEMPERATURE: 98.7 F | HEIGHT: 73 IN | BODY MASS INDEX: 21.74 KG/M2 | SYSTOLIC BLOOD PRESSURE: 98 MMHG | OXYGEN SATURATION: 98 % | HEART RATE: 50 BPM | WEIGHT: 164 LBS

## 2025-01-17 DIAGNOSIS — M79.10 MUSCLE PAIN: ICD-10-CM

## 2025-01-17 DIAGNOSIS — R68.89 EXERCISE INTOLERANCE: ICD-10-CM

## 2025-01-17 DIAGNOSIS — M62.81 MUSCLE WEAKNESS: ICD-10-CM

## 2025-01-17 DIAGNOSIS — Z23 NEED FOR INFLUENZA VACCINATION: ICD-10-CM

## 2025-01-17 DIAGNOSIS — Z00.00 ENCOUNTER FOR WELLNESS EXAMINATION IN ADULT: Primary | ICD-10-CM

## 2025-01-17 PROCEDURE — 90656 IIV3 VACC NO PRSV 0.5 ML IM: CPT | Performed by: NURSE PRACTITIONER

## 2025-01-17 PROCEDURE — 90471 IMMUNIZATION ADMIN: CPT | Performed by: NURSE PRACTITIONER

## 2025-01-17 PROCEDURE — 99396 PREV VISIT EST AGE 40-64: CPT | Performed by: NURSE PRACTITIONER

## 2025-01-17 NOTE — PROGRESS NOTES
"NGHIA Yeager is a 44 y.o. male presenting for Annual Exam (Would like to have same day labs done today and include ferritin labs. Is fasting.  )    His current/chronic health conditions include:  Patient Active Problem List   Diagnosis    Environmental allergies       Outpatient Medications Marked as Taking for the 1/17/25 encounter (Office Visit) with Brenda Thurman APRN   Medication Sig Dispense Refill    Ferrous Sulfate (SLOW FE PO) Take  by mouth.            Health Habits:  Nutrition: \"decent\"  Exercise: regularly    Screenings:  PSA: n/a  Colon Cancer: n/a  Tob use: n/a      While he states he feels well overall, he has noted a slightly decreased exercise capacity while running. He also notes fluctuations in stool consistency; looser on some days and then solid every 3 days or so. He was concerned this could perhaps be related to low iron and requests to check this level. He does affirms some muscle pain and weakness.      The patient's allergies, current medications, problem list, past medical history, past family history, and past social history were reviewed and updated as appropriate.        OBJECTIVE    Vitals:    01/17/25 0821   BP: 98/62   BP Location: Left arm   Patient Position: Sitting   Cuff Size: Adult   Pulse: 50   Temp: 98.7 °F (37.1 °C)   TempSrc: Infrared   SpO2: 98%   Weight: 74.4 kg (164 lb)   Height: 184.4 cm (72.6\")       BP Readings from Last 3 Encounters:   01/17/25 98/62   12/23/22 104/68   12/19/22 106/68       Wt Readings from Last 3 Encounters:   01/17/25 74.4 kg (164 lb)   12/23/22 74.1 kg (163 lb 6.4 oz)   12/19/22 72.5 kg (159 lb 12.8 oz)       Body mass index is 21.88 kg/m².  Nursing notes and vital signs reviewed.    Physical Exam  Constitutional:       General: He is not in acute distress.     Appearance: Normal appearance. He is well-developed.   HENT:      Head: Normocephalic.      Right Ear: Hearing, tympanic membrane, ear canal and external ear normal.      " Left Ear: Hearing, tympanic membrane, ear canal and external ear normal.      Nose: Nose normal. No mucosal edema or rhinorrhea.      Mouth/Throat:      Mouth: Mucous membranes are moist.      Pharynx: Oropharynx is clear. Uvula midline.   Eyes:      General: Lids are normal.      Extraocular Movements: Extraocular movements intact.      Conjunctiva/sclera: Conjunctivae normal.      Pupils: Pupils are equal, round, and reactive to light.   Neck:      Thyroid: No thyroid mass or thyromegaly.   Cardiovascular:      Rate and Rhythm: Regular rhythm.      Pulses: Normal pulses.      Heart sounds: S1 normal and S2 normal. No murmur heard.     No friction rub. No gallop.   Pulmonary:      Effort: Pulmonary effort is normal.      Breath sounds: Normal breath sounds. No wheezing, rhonchi or rales.   Abdominal:      General: Bowel sounds are normal.      Palpations: Abdomen is soft.      Tenderness: There is no abdominal tenderness. There is no guarding.      Hernia: No hernia is present.   Musculoskeletal:         General: No deformity. Normal range of motion.      Cervical back: Normal range of motion and neck supple.   Lymphadenopathy:      Cervical: No cervical adenopathy.   Skin:     General: Skin is warm and dry.      Findings: No lesion or rash.   Neurological:      General: No focal deficit present.      Mental Status: He is alert and oriented to person, place, and time.      Cranial Nerves: No cranial nerve deficit.      Sensory: No sensory deficit.      Motor: Motor function is intact.      Coordination: Coordination is intact.      Gait: Gait normal.      Deep Tendon Reflexes: Reflexes are normal and symmetric.   Psychiatric:         Attention and Perception: He is attentive.         Mood and Affect: Mood and affect normal.         Speech: Speech normal.         Behavior: Behavior normal.         Thought Content: Thought content normal.           Data Reviewed:    No results found for this or any previous visit  (from the past 4 weeks).          Assessment & Plan  Encounter for wellness examination in adult    Orders:    CBC (No Diff)    Comprehensive Metabolic Panel    Lipid Panel With / Chol / HDL Ratio    TSH    Need for influenza vaccination    Orders:    Fluzone >6mos    Exercise intolerance    Orders:    Ferritin    TSH    Vitamin B12    Vitamin D,25-Hydroxy    Muscle pain    Orders:    Ferritin    Vitamin B12    Vitamin D,25-Hydroxy    Muscle weakness    Orders:    Ferritin    Vitamin B12    Vitamin D,25-Hydroxy          Preventative counseling completed including relevant screenings, appropriate vaccinations, healthy nutrition, and appropriate physical activity. Age-appropriate Screening & Interventions recommended by USPSTF were reviewed with the patient, and Health Maintenance was updated in Epic.  BMI is within normal parameters. No other follow-up for BMI required.          The plan of care was discussed. All questions were answered. Patient verbalized understanding.        Return in about 1 year (around 1/17/2026) for fasting labs one week prior to, Annual physical.

## 2025-01-18 LAB
25(OH)D3+25(OH)D2 SERPL-MCNC: 28.1 NG/ML (ref 30–100)
ALBUMIN SERPL-MCNC: 4 G/DL (ref 3.5–5.2)
ALBUMIN/GLOB SERPL: 1.4 G/DL
ALP SERPL-CCNC: 79 U/L (ref 39–117)
ALT SERPL-CCNC: 44 U/L (ref 1–41)
AST SERPL-CCNC: 37 U/L (ref 1–40)
BILIRUB SERPL-MCNC: 1.4 MG/DL (ref 0–1.2)
BUN SERPL-MCNC: 14 MG/DL (ref 6–20)
BUN/CREAT SERPL: 13.9 (ref 7–25)
CALCIUM SERPL-MCNC: 8.9 MG/DL (ref 8.6–10.5)
CHLORIDE SERPL-SCNC: 103 MMOL/L (ref 98–107)
CHOLEST SERPL-MCNC: 186 MG/DL (ref 0–200)
CHOLEST/HDLC SERPL: 3.1 {RATIO}
CO2 SERPL-SCNC: 29.8 MMOL/L (ref 22–29)
CREAT SERPL-MCNC: 1.01 MG/DL (ref 0.76–1.27)
EGFRCR SERPLBLD CKD-EPI 2021: 94 ML/MIN/1.73
ERYTHROCYTE [DISTWIDTH] IN BLOOD BY AUTOMATED COUNT: 12.6 % (ref 12.3–15.4)
FERRITIN SERPL-MCNC: 52.4 NG/ML (ref 30–400)
GLOBULIN SER CALC-MCNC: 2.8 GM/DL
GLUCOSE SERPL-MCNC: 93 MG/DL (ref 65–99)
HCT VFR BLD AUTO: 44.9 % (ref 37.5–51)
HDLC SERPL-MCNC: 60 MG/DL (ref 40–60)
HGB BLD-MCNC: 15.9 G/DL (ref 13–17.7)
LDLC SERPL CALC-MCNC: 117 MG/DL (ref 0–100)
MCH RBC QN AUTO: 32.2 PG (ref 26.6–33)
MCHC RBC AUTO-ENTMCNC: 35.4 G/DL (ref 31.5–35.7)
MCV RBC AUTO: 90.9 FL (ref 79–97)
PLATELET # BLD AUTO: 239 10*3/MM3 (ref 140–450)
POTASSIUM SERPL-SCNC: 5.2 MMOL/L (ref 3.5–5.2)
PROT SERPL-MCNC: 6.8 G/DL (ref 6–8.5)
RBC # BLD AUTO: 4.94 10*6/MM3 (ref 4.14–5.8)
SODIUM SERPL-SCNC: 140 MMOL/L (ref 136–145)
TRIGL SERPL-MCNC: 46 MG/DL (ref 0–150)
TSH SERPL DL<=0.005 MIU/L-ACNC: 1.87 UIU/ML (ref 0.27–4.2)
VIT B12 SERPL-MCNC: 465 PG/ML (ref 211–946)
VLDLC SERPL CALC-MCNC: 9 MG/DL (ref 5–40)
WBC # BLD AUTO: 4.46 10*3/MM3 (ref 3.4–10.8)